# Patient Record
Sex: FEMALE | Race: WHITE
[De-identification: names, ages, dates, MRNs, and addresses within clinical notes are randomized per-mention and may not be internally consistent; named-entity substitution may affect disease eponyms.]

---

## 2022-07-08 ENCOUNTER — HOSPITAL ENCOUNTER (INPATIENT)
Dept: HOSPITAL 12 - ER | Age: 77
LOS: 1 days | Discharge: HOME | DRG: 880 | End: 2022-07-09
Payer: MEDICARE

## 2022-07-08 VITALS — HEIGHT: 67 IN | BODY MASS INDEX: 21.19 KG/M2 | WEIGHT: 135 LBS

## 2022-07-08 VITALS — SYSTOLIC BLOOD PRESSURE: 152 MMHG | DIASTOLIC BLOOD PRESSURE: 74 MMHG

## 2022-07-08 DIAGNOSIS — G45.4: ICD-10-CM

## 2022-07-08 DIAGNOSIS — F44.9: Primary | ICD-10-CM

## 2022-07-08 DIAGNOSIS — I10: ICD-10-CM

## 2022-07-08 DIAGNOSIS — Z72.89: ICD-10-CM

## 2022-07-08 DIAGNOSIS — F05: ICD-10-CM

## 2022-07-08 DIAGNOSIS — H53.8: ICD-10-CM

## 2022-07-08 DIAGNOSIS — G93.41: ICD-10-CM

## 2022-07-08 DIAGNOSIS — E78.5: ICD-10-CM

## 2022-07-08 DIAGNOSIS — F41.9: ICD-10-CM

## 2022-07-08 LAB
ALP SERPL-CCNC: 75 U/L (ref 50–136)
ALT SERPL W/O P-5'-P-CCNC: 34 U/L (ref 14–59)
AMPHETAMINES UR QL SCN>1000 NG/ML: NEGATIVE
APPEARANCE UR: CLEAR
AST SERPL-CCNC: 19 U/L (ref 15–37)
BILIRUB DIRECT SERPL-MCNC: 0.1 MG/DL (ref 0–0.2)
BILIRUB SERPL-MCNC: 0.4 MG/DL (ref 0.2–1)
BILIRUB UR QL STRIP: NEGATIVE
BUN SERPL-MCNC: 21 MG/DL (ref 7–18)
CHLORIDE SERPL-SCNC: 101 MMOL/L (ref 98–107)
CHOLEST SERPL-MCNC: 221 MG/DL (ref ?–200)
CO2 SERPL-SCNC: 29 MMOL/L (ref 21–32)
COCAINE UR QL SCN: NEGATIVE
COLOR UR: YELLOW
CREAT SERPL-MCNC: 0.8 MG/DL (ref 0.6–1.3)
DEPRECATED SQUAMOUS URNS QL MICRO: (no result) /HPF
ETHANOL SERPL-MCNC: < 3 MG/DL (ref 0–0)
GLUCOSE SERPL-MCNC: 101 MG/DL (ref 74–106)
GLUCOSE UR STRIP-MCNC: NEGATIVE MG/DL
HCT VFR BLD AUTO: 43.2 % (ref 31.2–41.9)
HDLC SERPL-MCNC: 71 MG/DL (ref 40–60)
HGB UR QL STRIP: (no result)
KETONES UR STRIP-MCNC: NEGATIVE MG/DL
LEUKOCYTE ESTERASE UR QL STRIP: NEGATIVE
MCH RBC QN AUTO: 33.1 UUG (ref 24.7–32.8)
MCV RBC AUTO: 96.1 FL (ref 75.5–95.3)
NITRITE UR QL STRIP: NEGATIVE
OPIATES UR QL SCN: NEGATIVE
PCP UR QL SCN>25 NG/ML: NEGATIVE
PH UR STRIP: 7 [PH] (ref 5–8)
PLATELET # BLD AUTO: 372 K/UL (ref 179–408)
POTASSIUM SERPL-SCNC: 3.6 MMOL/L (ref 3.5–5.1)
RBC #/AREA URNS HPF: (no result) /HPF (ref 0–3)
SP GR UR STRIP: 1.01 (ref 1–1.03)
THC UR QL SCN>50 NG/ML: NEGATIVE
TRIGL SERPL-MCNC: 130 MG/DL (ref 30–150)
TSH SERPL DL<=0.005 MIU/L-ACNC: 0.31 MIU/ML (ref 0.36–3.74)
UROBILINOGEN UR STRIP-MCNC: 0.2 E.U./DL
WBC #/AREA URNS HPF: (no result) /HPF
WBC #/AREA URNS HPF: (no result) /HPF (ref 0–3)
WS STN SPEC: 7.6 G/DL (ref 6.4–8.2)

## 2022-07-08 PROCEDURE — A4663 DIALYSIS BLOOD PRESSURE CUFF: HCPCS

## 2022-07-08 PROCEDURE — G0378 HOSPITAL OBSERVATION PER HR: HCPCS

## 2022-07-08 PROCEDURE — A9575 INJ GADOTERATE MEGLUMI 0.1ML: HCPCS

## 2022-07-08 PROCEDURE — G0480 DRUG TEST DEF 1-7 CLASSES: HCPCS

## 2022-07-08 RX ADMIN — Medication SCH EACH: at 20:46

## 2022-07-08 NOTE — NUR
Pt just returned from MRI exam transported by BLS transport via gurney.  Exam 
was completed without incident.  Pt placed back onto hospital gurney in pos of 
comfort.  VSS, Pt has good color, temp and appearance, in good spirits, 
accompanied by daughter at the bedside.  Transport to and from MRI was incident 
free.  Pt reconnected to bedside monitor.

## 2022-07-08 NOTE — NUR
BEDSIDE SWALLOW EVAL DONE. PATIENT ABLE TO SWALLOW AND DRINK WITHOUT ANY DIFFICULTY. 
NEURO-CHECKS WNL. NO FACIAL DROOP NOTED. WILL CONTINUE TO MONITOR AND ASSESS.

## 2022-07-08 NOTE — NUR
Hospitalist Sandra Verde at bedside to eval pt.  Pt admitted to room 319 under 
Provider Mehreen. Will call to see if they can accept report.

## 2022-07-08 NOTE — NUR
US tech at bedside to perform venus doppler study.  Pt gone to the bathroom 
just before hand.  VSS, no s/sx of distress present.

## 2022-07-08 NOTE — NUR
EDMD at bedside assessing pt, along with Neurologist via telemed.  Telemed 
neuroMD interviewing pt currently. Ni[facility] scale of 1  AAOx3, pt does not know the 
correct date.

## 2022-07-08 NOTE — NUR
PATIENT BROUGHT UP TO THE FLOOR VIA GURNEY. PATIENT AWAKE AND ALERT IN BED, WITH FAMILY AT 
BEDSIDE. PATIENT IS DEMANDING TO EAT. CALLED OUT TO YESENIA GAINES NP FOR FURTHER ORDERS.

## 2022-07-08 NOTE — NUR
BLS transport here to  pt for transfer to MRI diagnostic and return here 
to room 1B after exam is complete.  Pt loaded up, VSS, PE WNL, NAD.  no s/sx of 
distress present. ordered meds administered as ordered by EDMD.

## 2022-07-08 NOTE — NUR
Pt transfered to room 319 via rney without difficulty or incident. Pt placed 
on bed in pos of comfort, oriented to room, given call light, bed dropped, 
rails up, CNA and RN at bedside for intake. initial admit VSS.  no s/sx of 
distress noted.  Pt given 2 cups of apple juice per pt request due to her mouth 
being parched.

## 2022-07-08 NOTE — NUR
Pt just brought back from CT tranported by ct tech via gurney without 
difficulty.  pt reconnected to bedside monitor, VSS.

## 2022-07-09 VITALS — SYSTOLIC BLOOD PRESSURE: 143 MMHG | DIASTOLIC BLOOD PRESSURE: 67 MMHG

## 2022-07-09 VITALS — DIASTOLIC BLOOD PRESSURE: 65 MMHG | SYSTOLIC BLOOD PRESSURE: 139 MMHG

## 2022-07-09 LAB
BUN SERPL-MCNC: 17 MG/DL (ref 7–18)
CHLORIDE SERPL-SCNC: 104 MMOL/L (ref 98–107)
CO2 SERPL-SCNC: 29 MMOL/L (ref 21–32)
CREAT SERPL-MCNC: 0.7 MG/DL (ref 0.6–1.3)
GLUCOSE SERPL-MCNC: 105 MG/DL (ref 74–106)
HCT VFR BLD AUTO: 37.7 % (ref 31.2–41.9)
MCH RBC QN AUTO: 33.2 UUG (ref 24.7–32.8)
MCV RBC AUTO: 96.9 FL (ref 75.5–95.3)
PLATELET # BLD AUTO: 303 K/UL (ref 179–408)
POTASSIUM SERPL-SCNC: 3.6 MMOL/L (ref 3.5–5.1)

## 2022-07-09 RX ADMIN — Medication SCH EACH: at 05:50

## 2022-07-09 NOTE — NUR
PATIENT ASLEEP IN BED. SLEPT WELL THROUGHOUT THE NIGHT. ON TELE SR. VS WNL. CALL LIGHT IN 
REACH. ALL NEEDS ATTENDED. WILL CONTINUE TO MONITOR AND ASSESS.